# Patient Record
Sex: MALE | URBAN - METROPOLITAN AREA
[De-identification: names, ages, dates, MRNs, and addresses within clinical notes are randomized per-mention and may not be internally consistent; named-entity substitution may affect disease eponyms.]

---

## 2020-01-31 ENCOUNTER — NEW PATIENT (OUTPATIENT)
Dept: URBAN - METROPOLITAN AREA CLINIC 24 | Facility: CLINIC | Age: 85
End: 2020-01-31
Payer: COMMERCIAL

## 2020-01-31 DIAGNOSIS — Z96.1 PRESENCE OF INTRAOCULAR LENS: ICD-10-CM

## 2020-01-31 DIAGNOSIS — H17.822 PERIPHERAL OPACITY OF CORNEA OF LEFT EYE: ICD-10-CM

## 2020-01-31 DIAGNOSIS — H54.415A BLINDNESS RIGHT EYE CATEGORY 5, NORMAL VISION LEFT EYE: ICD-10-CM

## 2020-01-31 DIAGNOSIS — H40.1121 PRIMARY OPEN-ANGLE GLAUCOMA, MILD STAGE, LEFT EYE: Primary | ICD-10-CM

## 2020-01-31 PROCEDURE — 92004 COMPRE OPH EXAM NEW PT 1/>: CPT | Performed by: OPTOMETRIST

## 2020-01-31 PROCEDURE — 92134 CPTRZ OPH DX IMG PST SGM RTA: CPT | Performed by: OPTOMETRIST

## 2020-01-31 PROCEDURE — 92083 EXTENDED VISUAL FIELD XM: CPT | Performed by: OPTOMETRIST

## 2020-01-31 PROCEDURE — 92133 CPTRZD OPH DX IMG PST SGM ON: CPT | Performed by: OPTOMETRIST

## 2020-01-31 ASSESSMENT — KERATOMETRY
OD: 0.00
OS: 43.33

## 2020-01-31 ASSESSMENT — VISUAL ACUITY
OS: 20/40
OD: PROS

## 2020-01-31 ASSESSMENT — INTRAOCULAR PRESSURE: OS: 17

## 2020-03-13 ENCOUNTER — FOLLOW UP ESTABLISHED (OUTPATIENT)
Dept: URBAN - METROPOLITAN AREA CLINIC 24 | Facility: CLINIC | Age: 85
End: 2020-03-13
Payer: COMMERCIAL

## 2020-03-13 PROCEDURE — 92020 GONIOSCOPY: CPT | Performed by: OPHTHALMOLOGY

## 2020-03-13 PROCEDURE — 92083 EXTENDED VISUAL FIELD XM: CPT | Performed by: OPHTHALMOLOGY

## 2020-03-13 PROCEDURE — 76514 ECHO EXAM OF EYE THICKNESS: CPT | Performed by: OPHTHALMOLOGY

## 2020-03-13 PROCEDURE — 92004 COMPRE OPH EXAM NEW PT 1/>: CPT | Performed by: OPHTHALMOLOGY

## 2020-03-13 PROCEDURE — 92014 COMPRE OPH EXAM EST PT 1/>: CPT | Performed by: OPHTHALMOLOGY

## 2020-03-13 ASSESSMENT — INTRAOCULAR PRESSURE: OS: 17

## 2020-09-11 ENCOUNTER — FOLLOW UP ESTABLISHED (OUTPATIENT)
Dept: URBAN - METROPOLITAN AREA CLINIC 24 | Facility: CLINIC | Age: 85
End: 2020-09-11
Payer: COMMERCIAL

## 2020-09-11 DIAGNOSIS — H02.056 TRICHIASIS WITHOUT ENTROPION LEFT EYE, UNSPECIFIED EYELID: ICD-10-CM

## 2020-09-11 PROCEDURE — 67820 REVISE EYELASHES: CPT | Performed by: OPHTHALMOLOGY

## 2020-09-11 PROCEDURE — 92012 INTRM OPH EXAM EST PATIENT: CPT | Performed by: OPHTHALMOLOGY

## 2020-09-11 ASSESSMENT — INTRAOCULAR PRESSURE: OS: 17

## 2020-09-25 ENCOUNTER — SURGERY (OUTPATIENT)
Dept: URBAN - METROPOLITAN AREA SURGERY 12 | Facility: SURGERY | Age: 85
End: 2020-09-25
Payer: COMMERCIAL

## 2020-09-25 PROCEDURE — 65855 TRABECULOPLASTY LASER SURG: CPT | Performed by: OPHTHALMOLOGY

## 2020-11-06 ENCOUNTER — FOLLOW UP ESTABLISHED (OUTPATIENT)
Dept: URBAN - METROPOLITAN AREA CLINIC 24 | Facility: CLINIC | Age: 85
End: 2020-11-06
Payer: COMMERCIAL

## 2020-11-06 PROCEDURE — 92012 INTRM OPH EXAM EST PATIENT: CPT | Performed by: OPHTHALMOLOGY

## 2020-11-06 ASSESSMENT — INTRAOCULAR PRESSURE: OS: 18

## 2022-03-17 ENCOUNTER — OFFICE VISIT (OUTPATIENT)
Dept: URBAN - METROPOLITAN AREA CLINIC 23 | Facility: CLINIC | Age: 87
End: 2022-03-17
Payer: COMMERCIAL

## 2022-03-17 DIAGNOSIS — Z97.0 PRESENCE OF ARTIFICIAL EYE: ICD-10-CM

## 2022-03-17 PROCEDURE — 99214 OFFICE O/P EST MOD 30 MIN: CPT | Performed by: OPHTHALMOLOGY

## 2022-03-17 ASSESSMENT — INTRAOCULAR PRESSURE: OS: 19

## 2022-03-17 NOTE — IMPRESSION/PLAN
Impression: Primary open-angle glaucoma, mild stage, left eye
h/o trabeculectomy inferior - unknown when or where it was performed Monocular patient
-- s/p SLT OS Plan: Pt has Glaucoma    Gonio :  ss 1+ pg with inferior synechiae      Pachs:  478 OS    Today's IOP :    19 OS  T max : 19 Target IOP low to mid teens Pt denies Fhx of Glaucoma Left eye is only seeing eye VF 3/17/22 OS: Central defect C/D:  .5-.5 OS - stable 3/17/22 OCT:3/17/22 66 OS - stable 3/17/22 Pt denies Sulfa Allergy   // Pt denies Lung /Heart dx Pt is currently using : dorzolamide bid, vazquez . 5% qam, travatan z qhs No previous medications used Plan :
1. Continue :
dorzolamide bid
vazquez . 5% qam 
travatan z qhs 
2. Patient's testing all remains stable from 2 years ago. 3.  IOP and condition appear stable today. No changes being made to current regimen. Recommend monitoring condition at this time. 
4. Continue close monitoring with optometry given monocular status - return in 6 months with HVF to ensure stability with current IOP

## 2022-03-17 NOTE — IMPRESSION/PLAN
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  Plan: Patient wishes to see an oucularist. refer to Dr. Remberto SERNA

## 2022-03-18 ENCOUNTER — OFFICE VISIT (OUTPATIENT)
Dept: URBAN - METROPOLITAN AREA CLINIC 23 | Facility: CLINIC | Age: 87
End: 2022-03-18
Payer: COMMERCIAL

## 2022-03-18 DIAGNOSIS — H52.222 REGULAR ASTIGMATISM, LEFT EYE: Primary | ICD-10-CM

## 2022-03-18 PROCEDURE — 92004 COMPRE OPH EXAM NEW PT 1/>: CPT | Performed by: OPTOMETRIST

## 2022-03-18 ASSESSMENT — INTRAOCULAR PRESSURE: OS: 17

## 2022-03-18 ASSESSMENT — VISUAL ACUITY: OS: 20/60

## 2022-03-18 NOTE — IMPRESSION/PLAN
Impression: Regular astigmatism, left eye: H52.222. Plan: Discussed diagnosis in detail with patient. New glasses Rx was given today.